# Patient Record
(demographics unavailable — no encounter records)

---

## 2025-02-12 NOTE — PHYSICAL EXAM
[Normocephalic] : normocephalic [EOMI] : extra ocular movement intact [Supple] : supple [No Supraclavicular Adenopathy] : no supraclavicular adenopathy [No Cervical Adenopathy] : no cervical adenopathy [de-identified] : Bilateral breast/axilla/supraclavicular area: No masses, discharge, or adenopathy\par

## 2025-02-12 NOTE — PAST MEDICAL HISTORY
[Menstruating] : The patient is menstruating [Menarche Age ____] : age at menarche was [unfilled] [Definite ___ (Date)] : the last menstrual period was [unfilled] [Total Preg ___] : G[unfilled] [Live Births ___] : P[unfilled]  [Abortions ___] : Abortions:[unfilled] [Age At Live Birth ___] : Age at live birth: [unfilled] [Perimenopausal] : The patient is perimenopausal

## 2025-02-12 NOTE — HISTORY OF PRESENT ILLNESS
[FreeTextEntry1] : Patient 49 yo F patient presents for breast cancer screening (former Dr. Rosales pt). Of note, patient reached out to the office 8/7/2024 to discuss B/L breast tenderness/enlargement.  Patient educated on risk reduction strategies and encouraged follow-up with GYN and PCP for perimenopausal symptoms and weight gain concerns after starting Lexapro. At patient's last annual B/MG/US 2/8/2024 patient had L questioned architectural distortion for which MRI was recommended with benign findings 2/27/2024. Fhx of breast cancer of maternal aunt (dx at 50 and 56- no genetic testing).  Patient is BRCA/full panel negative (tested 1/2024). Patient denies palpable breast masses or nipple discharge. Of note, patient following with endocrinologist for thyroid nodules, has upcoming bx scheduled  QUANG Lifetime Risk- 27.8%   3/27/2018: B/L MG- Fibroglandular. Benign calcs, stable. BIRADS 2. 4/3/2019: B/L MG & US- Benign calcs, stable. US- R 0.4cm, 1:00, 3FN benign cyst. R 0.6cm, 9:00, 10FN benign appearing LN. L 0.8cm, 2:00, 5FN cyst w/ debris (6 mo. f/u rec.). BIRADS 3. 8/24/20: B/L MG & US- Extremely dense. RUOQ dense tissue, stable. US- L 0.8cm, 2:00, 5FN complicated cyst vs benign solid nodule, stable. BIRADS 2.  11/4/21: B/L MG & US- L stable 0.8cm solid nodule at 2:00, 5cmfn- BI RADS 2 11/14/22: B/L MG & US- dense, L 0.9cm stable oval mass - BIRAD 2 2/08/24: B/l MG & US- extremely dense, L questioned architectural distortion in the superior anterior to the inferior portion of the left pectoralis muscle (rec MRI for further eval given Fhx & dense tissue). US- L stable 0.8 cm mass 2:00 5FN. BIRADS 0. 2/27/24: MRI- heterogeneously dense, No suspicious enhancement detected bilaterally. Nipples and skin unremarkable. No significant axillary or internal mammary lymphadenopathy. MIREYA. BIRADS 2. Rec annual b/l MG/US Feb 2025 and MRI Summer 2025. 2/12/25: B/l MG/US: Extremely dense. US-R 6:00 8 FN dense breast tissue.  L stable 0.9 cm 12:00 5 FN mass.  MIREYA.  No lymphadenopathy.  BI-RADS 2

## 2025-02-12 NOTE — PHYSICAL EXAM
[Normocephalic] : normocephalic [EOMI] : extra ocular movement intact [Supple] : supple [No Supraclavicular Adenopathy] : no supraclavicular adenopathy [No Cervical Adenopathy] : no cervical adenopathy [de-identified] : Bilateral breast/axilla/supraclavicular area: No masses, discharge, or adenopathy\par

## 2025-02-12 NOTE — HISTORY OF PRESENT ILLNESS
[FreeTextEntry1] : Patient 47 yo F patient presents for breast cancer screening (former Dr. Rosales pt). Of note, patient reached out to the office 8/7/2024 to discuss B/L breast tenderness/enlargement.  Patient educated on risk reduction strategies and encouraged follow-up with GYN and PCP for perimenopausal symptoms and weight gain concerns after starting Lexapro. At patient's last annual B/MG/US 2/8/2024 patient had L questioned architectural distortion for which MRI was recommended with benign findings 2/27/2024. Fhx of breast cancer of maternal aunt (dx at 50 and 56- no genetic testing).  Patient is BRCA/full panel negative (tested 1/2024). Patient denies palpable breast masses or nipple discharge. Of note, patient following with endocrinologist for thyroid nodules, has upcoming bx scheduled  QUANG Lifetime Risk- 27.8%   3/27/2018: B/L MG- Fibroglandular. Benign calcs, stable. BIRADS 2. 4/3/2019: B/L MG & US- Benign calcs, stable. US- R 0.4cm, 1:00, 3FN benign cyst. R 0.6cm, 9:00, 10FN benign appearing LN. L 0.8cm, 2:00, 5FN cyst w/ debris (6 mo. f/u rec.). BIRADS 3. 8/24/20: B/L MG & US- Extremely dense. RUOQ dense tissue, stable. US- L 0.8cm, 2:00, 5FN complicated cyst vs benign solid nodule, stable. BIRADS 2.  11/4/21: B/L MG & US- L stable 0.8cm solid nodule at 2:00, 5cmfn- BI RADS 2 11/14/22: B/L MG & US- dense, L 0.9cm stable oval mass - BIRAD 2 2/08/24: B/l MG & US- extremely dense, L questioned architectural distortion in the superior anterior to the inferior portion of the left pectoralis muscle (rec MRI for further eval given Fhx & dense tissue). US- L stable 0.8 cm mass 2:00 5FN. BIRADS 0. 2/27/24: MRI- heterogeneously dense, No suspicious enhancement detected bilaterally. Nipples and skin unremarkable. No significant axillary or internal mammary lymphadenopathy. MIREYA. BIRADS 2. Rec annual b/l MG/US Feb 2025 and MRI Summer 2025. 2/12/25: B/l MG/US: Extremely dense. US-R 6:00 8 FN dense breast tissue.  L stable 0.9 cm 12:00 5 FN mass.  MIREYA.  No lymphadenopathy.  BI-RADS 2